# Patient Record
Sex: MALE | Race: WHITE
[De-identification: names, ages, dates, MRNs, and addresses within clinical notes are randomized per-mention and may not be internally consistent; named-entity substitution may affect disease eponyms.]

---

## 2019-06-14 ENCOUNTER — HOSPITAL ENCOUNTER (EMERGENCY)
Dept: HOSPITAL 41 - JD.ED | Age: 39
Discharge: HOME | End: 2019-06-14
Payer: COMMERCIAL
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
## 2019-06-14 DIAGNOSIS — F48.8: ICD-10-CM

## 2019-06-14 DIAGNOSIS — S61.412A: Primary | ICD-10-CM

## 2019-06-14 DIAGNOSIS — Z79.899: ICD-10-CM

## 2019-06-14 NOTE — EDM.PDOC
ED HPI GENERAL MEDICAL PROBLEM





- General


Chief Complaint: Neurological Problem


Stated Complaint: [facility]


Time Seen by Provider: 06/14/19 11:32


Source of Information: Reports: Patient, EMS


History Limitations: Reports: No Limitations





- History of Present Illness


INITIAL COMMENTS - FREE TEXT/NARRATIVE: 





The patient presents by [facility] for seizure activity.  The patient 

was at a job site and he got his left thumb pinched in a hitch.  He had severe 

pain and he was getting lightheaded.  He leaned on his truck and tried to 

compose himself.  He went passed out and had some shaking.  He got up again and 

he was confused and he passed out again.  The patient was out for a few seconds 

each time.  He was confused after that.  He says this has happened before when 

he hurts himself.  He did have a brain tumor in 2012.  He had surgery to remove 

it and he goes back for regular check ups.  There has been no problems since.  

He has no history of seizures.  He has no headache, neck pain, chest pain, 

shortness of breath, abdominal pain, nausea, vomiting, numbness or weakness.  

He feels good and back to baseline.  He does have a superficial laceration to 

his left index finger and left thumb.


Onset: Sudden


Duration: Hour(s):


Location: Reports: Upper Extremity, Left (thumb)


Quality: Reports: Sharp


Severity: Mild


Improves with: Reports: None


Worsens with: Reports: None


Associated Symptoms: Reports: Syncope.  Denies: Chest Pain, Cough, Fever/Chills

, Headaches, Nausea/Vomiting, Shortness of Breath





- Related Data


 Allergies











Allergy/AdvReac Type Severity Reaction Status Date / Time


 


No Known Allergies Allergy   Verified 06/14/19 11:37











Home Meds: 


 Home Meds





Biotin 1 tab PO DAILY 06/14/19 [History]


Sertraline [Zoloft] 100 mg PO DAILY 06/14/19 [History]











Past Medical History


Neurological History: Reports: Other (See Below)


Other Neuro History: brain tumor revoed 2012-had received chemo and radiation





ED ROS GENERAL





- Review of Systems


Review Of Systems: See Below


Constitutional: Reports: No Symptoms


HEENT: Reports: No Symptoms


Respiratory: Reports: No Symptoms


Cardiovascular: Reports: Syncope.  Denies: Chest Pain, Edema


Endocrine: Reports: No Symptoms


GI/Abdominal: Reports: No Symptoms


: Reports: No Symptoms


Musculoskeletal: Reports: No Symptoms





- Physical Exam


Exam: See Below


Exam Limited By: No Limitations


General Appearance: Alert, No Apparent Distress


Ears: Normal External Exam


Nose: Normal Inspection


Head Exam: Atraumatic, Normocephalic


Neck: Normal Inspection


Respiratory/Chest: No Respiratory Distress, Lungs Clear, Normal Breath Sounds


Cardiovascular: Regular Rate, Rhythm, No Edema, No Murmur


GI/Abdominal: Soft, Non-Tender, No Organomegaly, No Mass


Neuro Exam (Abbreviated): Alert, Oriented, No Motor/Sensory Deficits


Extremities: Other (superficial laceration to left index finger and superficial 

laceration to the left thumb with good sensation and capillary refill)





Course





- Vital Signs


Last Recorded V/S: 





 Last Vital Signs











Temp  98.1 F   06/14/19 11:40


 


Pulse  72   06/14/19 11:40


 


Resp  20   06/14/19 11:40


 


BP  124/75   06/14/19 11:40


 


Pulse Ox  99   06/14/19 11:40














- Re-Assessments/Exams


Free Text/Narrative Re-Assessment/Exam: 





06/14/19 12:07


The patient did not want any work up done.  This has happened before and he 

gets regular check ups related to his brain tumor.  He is back to baseline now.

  I feel it is safe to let him go.





Departure





- Departure


Time of Disposition: 12:10


Disposition: Home, Self-Care 01


Condition: Good


Clinical Impression: 


Syncope


Qualifiers:


 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 

nonpsychotic mental disorders





Superficial laceration of hand


Qualifiers:


 Encounter type: initial encounter Laterality: left Qualified Code(s): S61.412A 

- Laceration without foreign body of left hand, initial encounter








- Discharge Information


*PRESCRIPTION DRUG MONITORING PROGRAM REVIEWED*: Not Applicable


*COPY OF PRESCRIPTION DRUG MONITORING REPORT IN PATIENT WEN: Not Applicable


Additional Instructions: 


Drink plenty of fluids.  Follow up with your doctor in a week.  Clean the 

laceration 2 times per day with warm soapy water and apply antibiotics after.  

Please return if you notice redness, swelling, pain or drainage from the wound.

  You may need oral antibiotics.